# Patient Record
Sex: FEMALE | Race: WHITE | URBAN - METROPOLITAN AREA
[De-identification: names, ages, dates, MRNs, and addresses within clinical notes are randomized per-mention and may not be internally consistent; named-entity substitution may affect disease eponyms.]

---

## 2023-03-16 ENCOUNTER — APPOINTMENT (RX ONLY)
Dept: URBAN - METROPOLITAN AREA CLINIC 310 | Facility: CLINIC | Age: 43
Setting detail: DERMATOLOGY
End: 2023-03-16

## 2023-03-16 DIAGNOSIS — L30.8 OTHER SPECIFIED DERMATITIS: ICD-10-CM | Status: WORSENING

## 2023-03-16 PROCEDURE — ? COUNSELING

## 2023-03-16 PROCEDURE — ? ADDITIONAL NOTES

## 2023-03-16 PROCEDURE — ? PRESCRIPTION

## 2023-03-16 PROCEDURE — 99203 OFFICE O/P NEW LOW 30 MIN: CPT

## 2023-03-16 RX ORDER — HYDROCORTISONE 25 MG/G
OINTMENT TOPICAL
Qty: 28.35 | Refills: 0 | Status: ERX | COMMUNITY
Start: 2023-03-16

## 2023-03-16 RX ADMIN — HYDROCORTISONE: 25 OINTMENT TOPICAL at 00:00

## 2023-03-16 ASSESSMENT — LOCATION DETAILED DESCRIPTION DERM: LOCATION DETAILED: LEFT INFERIOR VERMILION LIP

## 2023-03-16 ASSESSMENT — LOCATION ZONE DERM: LOCATION ZONE: LIP

## 2023-03-16 ASSESSMENT — LOCATION SIMPLE DESCRIPTION DERM: LOCATION SIMPLE: LEFT LIP

## 2023-03-16 ASSESSMENT — ITCH NUMERIC RATING SCALE: HOW SEVERE IS YOUR ITCHING?: 4

## 2023-03-16 NOTE — PROCEDURE: ADDITIONAL NOTES
Additional Notes: Discussed treatment options including biopsy vs. topical steroid vs. referral to allergist vs. patch testing, along with risks and benefits of each. Advised that suspicion for infection viral or bacterial is low. Pt opted to try topical steroid and continue to cut out things that she knows are irritating. Recommended hydrocortisone 2.5% ointment BID for 2 weeks.
Detail Level: Simple
Render Risk Assessment In Note?: yes
Additional Notes: Patient endorses mild peeling and pruritus of her lips x 2 months.  States lips become more sensitive around her menses and \"swell\".  She hasn't noticed any exacerbating features specifically but has decreased the number of sweets and salty foods she eats as these seem to cause burning.  Patient has tried multiple chapsticks without improvement of symptoms.  On exam, very mild peeling noted, no lesions concerning for lichen planus noted.  Discussed do not think a biopsy would be helpful but am happy to biopsy if the patient would like, she declines.  Clinically, not overly consistent with actinic cheilitis and patient does not have much sun damage.  Symptoms are also present on top and bottom lip making suspicion for actinic cheilitis lower.  Discussed other ddx could be allergic contact dermatitis.  Opted trial of hydrocortisone ointment.  Patient to call us in 2 weeks to let us know how she's doing.  If not improved, will consider patch testing.

## 2023-03-16 NOTE — HPI: RASH
What Type Of Note Output Would You Prefer (Optional)?: Standard Output
How Severe Is Your Rash?: moderate
Is This A New Presentation, Or A Follow-Up?: Rash
Additional History: Pt presents for irritated, itchy, skin on the lips. Pt has tried changing her diet with some improvement. She stated it started around the same time as she was diagnosed with vaginitis and is curious if they could be related.